# Patient Record
Sex: FEMALE | Race: WHITE | Employment: OTHER | URBAN - METROPOLITAN AREA
[De-identification: names, ages, dates, MRNs, and addresses within clinical notes are randomized per-mention and may not be internally consistent; named-entity substitution may affect disease eponyms.]

---

## 2017-07-06 ENCOUNTER — HOSPITAL ENCOUNTER (EMERGENCY)
Age: 62
Discharge: ANOTHER HOSPITAL | End: 2017-07-07
Attending: EMERGENCY MEDICINE | Admitting: EMERGENCY MEDICINE
Payer: COMMERCIAL

## 2017-07-06 ENCOUNTER — APPOINTMENT (OUTPATIENT)
Dept: CT IMAGING | Age: 62
End: 2017-07-06
Attending: EMERGENCY MEDICINE
Payer: COMMERCIAL

## 2017-07-06 ENCOUNTER — APPOINTMENT (OUTPATIENT)
Dept: GENERAL RADIOLOGY | Age: 62
End: 2017-07-06
Attending: EMERGENCY MEDICINE
Payer: COMMERCIAL

## 2017-07-06 DIAGNOSIS — R90.0 INTRACRANIAL MASS: Primary | ICD-10-CM

## 2017-07-06 LAB
ALBUMIN SERPL BCP-MCNC: 4.1 G/DL (ref 3.5–5)
ALBUMIN/GLOB SERPL: 1.2 {RATIO} (ref 1.1–2.2)
ALP SERPL-CCNC: 50 U/L (ref 45–117)
ALT SERPL-CCNC: 22 U/L (ref 12–78)
ANION GAP BLD CALC-SCNC: 8 MMOL/L (ref 5–15)
APPEARANCE UR: CLEAR
AST SERPL W P-5'-P-CCNC: 10 U/L (ref 15–37)
BACTERIA URNS QL MICRO: NEGATIVE /HPF
BASOPHILS # BLD AUTO: 0 K/UL (ref 0–0.1)
BASOPHILS # BLD: 0 % (ref 0–1)
BILIRUB SERPL-MCNC: 0.5 MG/DL (ref 0.2–1)
BILIRUB UR QL: NEGATIVE
BUN SERPL-MCNC: 13 MG/DL (ref 6–20)
BUN/CREAT SERPL: 18 (ref 12–20)
CALCIUM SERPL-MCNC: 9.6 MG/DL (ref 8.5–10.1)
CHLORIDE SERPL-SCNC: 104 MMOL/L (ref 97–108)
CO2 SERPL-SCNC: 27 MMOL/L (ref 21–32)
COLOR UR: ABNORMAL
CREAT SERPL-MCNC: 0.72 MG/DL (ref 0.55–1.02)
EOSINOPHIL # BLD: 0 K/UL (ref 0–0.4)
EOSINOPHIL NFR BLD: 0 % (ref 0–7)
EPITH CASTS URNS QL MICRO: ABNORMAL /LPF
ERYTHROCYTE [DISTWIDTH] IN BLOOD BY AUTOMATED COUNT: 14 % (ref 11.5–14.5)
GLOBULIN SER CALC-MCNC: 3.5 G/DL (ref 2–4)
GLUCOSE SERPL-MCNC: 100 MG/DL (ref 65–100)
GLUCOSE UR STRIP.AUTO-MCNC: NEGATIVE MG/DL
HCT VFR BLD AUTO: 44.4 % (ref 35–47)
HGB BLD-MCNC: 14.7 G/DL (ref 11.5–16)
HGB UR QL STRIP: NEGATIVE
HYALINE CASTS URNS QL MICRO: ABNORMAL /LPF (ref 0–5)
KETONES UR QL STRIP.AUTO: ABNORMAL MG/DL
LEUKOCYTE ESTERASE UR QL STRIP.AUTO: NEGATIVE
LYMPHOCYTES # BLD AUTO: 10 % (ref 12–49)
LYMPHOCYTES # BLD: 1.5 K/UL (ref 0.8–3.5)
MCH RBC QN AUTO: 30.4 PG (ref 26–34)
MCHC RBC AUTO-ENTMCNC: 33.1 G/DL (ref 30–36.5)
MCV RBC AUTO: 91.7 FL (ref 80–99)
MONOCYTES # BLD: 1 K/UL (ref 0–1)
MONOCYTES NFR BLD AUTO: 7 % (ref 5–13)
NEUTS SEG # BLD: 12.1 K/UL (ref 1.8–8)
NEUTS SEG NFR BLD AUTO: 83 % (ref 32–75)
NITRITE UR QL STRIP.AUTO: NEGATIVE
PH UR STRIP: 5.5 [PH] (ref 5–8)
PLATELET # BLD AUTO: 232 K/UL (ref 150–400)
POTASSIUM SERPL-SCNC: 4.3 MMOL/L (ref 3.5–5.1)
PROT SERPL-MCNC: 7.6 G/DL (ref 6.4–8.2)
PROT UR STRIP-MCNC: NEGATIVE MG/DL
RBC # BLD AUTO: 4.84 M/UL (ref 3.8–5.2)
RBC #/AREA URNS HPF: ABNORMAL /HPF (ref 0–5)
SODIUM SERPL-SCNC: 139 MMOL/L (ref 136–145)
SP GR UR REFRACTOMETRY: 1.03 (ref 1–1.03)
UA: UC IF INDICATED,UAUC: ABNORMAL
UROBILINOGEN UR QL STRIP.AUTO: 0.2 EU/DL (ref 0.2–1)
WBC # BLD AUTO: 14.6 K/UL (ref 3.6–11)
WBC URNS QL MICRO: ABNORMAL /HPF (ref 0–4)

## 2017-07-06 PROCEDURE — 80053 COMPREHEN METABOLIC PANEL: CPT | Performed by: EMERGENCY MEDICINE

## 2017-07-06 PROCEDURE — 99284 EMERGENCY DEPT VISIT MOD MDM: CPT

## 2017-07-06 PROCEDURE — 96375 TX/PRO/DX INJ NEW DRUG ADDON: CPT

## 2017-07-06 PROCEDURE — 71010 XR CHEST PORT: CPT

## 2017-07-06 PROCEDURE — 70450 CT HEAD/BRAIN W/O DYE: CPT

## 2017-07-06 PROCEDURE — 96374 THER/PROPH/DIAG INJ IV PUSH: CPT

## 2017-07-06 PROCEDURE — 74011250636 HC RX REV CODE- 250/636: Performed by: EMERGENCY MEDICINE

## 2017-07-06 PROCEDURE — 85025 COMPLETE CBC W/AUTO DIFF WBC: CPT | Performed by: EMERGENCY MEDICINE

## 2017-07-06 PROCEDURE — 81001 URINALYSIS AUTO W/SCOPE: CPT | Performed by: EMERGENCY MEDICINE

## 2017-07-06 PROCEDURE — 36415 COLL VENOUS BLD VENIPUNCTURE: CPT | Performed by: EMERGENCY MEDICINE

## 2017-07-06 RX ORDER — DEXAMETHASONE SODIUM PHOSPHATE 4 MG/ML
10 INJECTION, SOLUTION INTRA-ARTICULAR; INTRALESIONAL; INTRAMUSCULAR; INTRAVENOUS; SOFT TISSUE
Status: COMPLETED | OUTPATIENT
Start: 2017-07-06 | End: 2017-07-06

## 2017-07-06 RX ORDER — PROCHLORPERAZINE EDISYLATE 5 MG/ML
10 INJECTION INTRAMUSCULAR; INTRAVENOUS
Status: COMPLETED | OUTPATIENT
Start: 2017-07-06 | End: 2017-07-06

## 2017-07-06 RX ORDER — DIPHENHYDRAMINE HYDROCHLORIDE 50 MG/ML
25 INJECTION, SOLUTION INTRAMUSCULAR; INTRAVENOUS
Status: COMPLETED | OUTPATIENT
Start: 2017-07-06 | End: 2017-07-06

## 2017-07-06 RX ADMIN — SODIUM CHLORIDE 1000 ML: 900 INJECTION, SOLUTION INTRAVENOUS at 21:11

## 2017-07-06 RX ADMIN — DIPHENHYDRAMINE HYDROCHLORIDE 25 MG: 50 INJECTION, SOLUTION INTRAMUSCULAR; INTRAVENOUS at 21:13

## 2017-07-06 RX ADMIN — DEXAMETHASONE SODIUM PHOSPHATE 10 MG: 4 INJECTION, SOLUTION INTRAMUSCULAR; INTRAVENOUS at 22:56

## 2017-07-06 RX ADMIN — PROCHLORPERAZINE EDISYLATE 10 MG: 5 INJECTION INTRAMUSCULAR; INTRAVENOUS at 21:13

## 2017-07-07 ENCOUNTER — APPOINTMENT (OUTPATIENT)
Dept: CT IMAGING | Age: 62
DRG: 054 | End: 2017-07-07
Attending: NEUROLOGICAL SURGERY
Payer: COMMERCIAL

## 2017-07-07 ENCOUNTER — HOSPITAL ENCOUNTER (INPATIENT)
Age: 62
LOS: 1 days | Discharge: HOME | DRG: 054 | End: 2017-07-08
Attending: FAMILY MEDICINE | Admitting: FAMILY MEDICINE
Payer: COMMERCIAL

## 2017-07-07 ENCOUNTER — APPOINTMENT (OUTPATIENT)
Dept: MRI IMAGING | Age: 62
DRG: 054 | End: 2017-07-07
Attending: FAMILY MEDICINE
Payer: COMMERCIAL

## 2017-07-07 VITALS
DIASTOLIC BLOOD PRESSURE: 79 MMHG | TEMPERATURE: 97.2 F | OXYGEN SATURATION: 99 % | BODY MASS INDEX: 28.37 KG/M2 | SYSTOLIC BLOOD PRESSURE: 117 MMHG | RESPIRATION RATE: 18 BRPM | WEIGHT: 180.78 LBS | HEART RATE: 76 BPM | HEIGHT: 67 IN

## 2017-07-07 PROBLEM — D72.829 LEUKOCYTOSIS: Status: ACTIVE | Noted: 2017-07-07

## 2017-07-07 PROBLEM — R51.9 HEADACHE: Status: ACTIVE | Noted: 2017-07-07

## 2017-07-07 PROBLEM — G93.6 VASOGENIC BRAIN EDEMA (HCC): Status: ACTIVE | Noted: 2017-07-07

## 2017-07-07 PROBLEM — R41.3 MEMORY LOSS: Status: ACTIVE | Noted: 2017-07-07

## 2017-07-07 PROBLEM — R11.2 NAUSEA & VOMITING: Status: ACTIVE | Noted: 2017-07-07

## 2017-07-07 PROBLEM — G93.89 RIGHT FRONTAL LOBE MASS: Status: ACTIVE | Noted: 2017-07-07

## 2017-07-07 LAB
ANION GAP BLD CALC-SCNC: 9 MMOL/L (ref 5–15)
APTT PPP: 26 SEC (ref 22.1–32.5)
BASOPHILS # BLD AUTO: 0 K/UL (ref 0–0.1)
BASOPHILS # BLD: 0 % (ref 0–1)
BUN SERPL-MCNC: 12 MG/DL (ref 6–20)
BUN/CREAT SERPL: 14 (ref 12–20)
CALCIUM SERPL-MCNC: 9.3 MG/DL (ref 8.5–10.1)
CHLORIDE SERPL-SCNC: 110 MMOL/L (ref 97–108)
CO2 SERPL-SCNC: 23 MMOL/L (ref 21–32)
CREAT SERPL-MCNC: 0.86 MG/DL (ref 0.55–1.02)
EOSINOPHIL # BLD: 0 K/UL (ref 0–0.4)
EOSINOPHIL NFR BLD: 0 % (ref 0–7)
ERYTHROCYTE [DISTWIDTH] IN BLOOD BY AUTOMATED COUNT: 13.8 % (ref 11.5–14.5)
GLUCOSE SERPL-MCNC: 191 MG/DL (ref 65–100)
HCT VFR BLD AUTO: 40.7 % (ref 35–47)
HGB BLD-MCNC: 13.3 G/DL (ref 11.5–16)
INR PPP: 1.1 (ref 0.9–1.1)
LYMPHOCYTES # BLD AUTO: 7 % (ref 12–49)
LYMPHOCYTES # BLD: 0.8 K/UL (ref 0.8–3.5)
MCH RBC QN AUTO: 29.9 PG (ref 26–34)
MCHC RBC AUTO-ENTMCNC: 32.7 G/DL (ref 30–36.5)
MCV RBC AUTO: 91.5 FL (ref 80–99)
MONOCYTES # BLD: 0.2 K/UL (ref 0–1)
MONOCYTES NFR BLD AUTO: 2 % (ref 5–13)
NEUTS SEG # BLD: 11.7 K/UL (ref 1.8–8)
NEUTS SEG NFR BLD AUTO: 91 % (ref 32–75)
PLATELET # BLD AUTO: 216 K/UL (ref 150–400)
POTASSIUM SERPL-SCNC: 4 MMOL/L (ref 3.5–5.1)
PROTHROMBIN TIME: 11.2 SEC (ref 9–11.1)
RBC # BLD AUTO: 4.45 M/UL (ref 3.8–5.2)
SODIUM SERPL-SCNC: 142 MMOL/L (ref 136–145)
THERAPEUTIC RANGE,PTTT: NORMAL SECS (ref 58–77)
WBC # BLD AUTO: 12.8 K/UL (ref 3.6–11)

## 2017-07-07 PROCEDURE — 74011250636 HC RX REV CODE- 250/636: Performed by: FAMILY MEDICINE

## 2017-07-07 PROCEDURE — 80048 BASIC METABOLIC PNL TOTAL CA: CPT | Performed by: FAMILY MEDICINE

## 2017-07-07 PROCEDURE — 85610 PROTHROMBIN TIME: CPT | Performed by: FAMILY MEDICINE

## 2017-07-07 PROCEDURE — 36415 COLL VENOUS BLD VENIPUNCTURE: CPT | Performed by: FAMILY MEDICINE

## 2017-07-07 PROCEDURE — 65660000000 HC RM CCU STEPDOWN

## 2017-07-07 PROCEDURE — 85025 COMPLETE CBC W/AUTO DIFF WBC: CPT | Performed by: FAMILY MEDICINE

## 2017-07-07 PROCEDURE — 85730 THROMBOPLASTIN TIME PARTIAL: CPT | Performed by: FAMILY MEDICINE

## 2017-07-07 PROCEDURE — 70553 MRI BRAIN STEM W/O & W/DYE: CPT

## 2017-07-07 PROCEDURE — 74011250636 HC RX REV CODE- 250/636: Performed by: NEUROLOGICAL SURGERY

## 2017-07-07 PROCEDURE — 74011000258 HC RX REV CODE- 258: Performed by: INTERNAL MEDICINE

## 2017-07-07 PROCEDURE — 71260 CT THORAX DX C+: CPT

## 2017-07-07 PROCEDURE — 74011250637 HC RX REV CODE- 250/637: Performed by: NEUROLOGICAL SURGERY

## 2017-07-07 PROCEDURE — 74011636320 HC RX REV CODE- 636/320: Performed by: INTERNAL MEDICINE

## 2017-07-07 PROCEDURE — 77030018846 HC SOL IRR STRL H20 ICUM -A

## 2017-07-07 PROCEDURE — A9585 GADOBUTROL INJECTION: HCPCS | Performed by: FAMILY MEDICINE

## 2017-07-07 PROCEDURE — 74177 CT ABD & PELVIS W/CONTRAST: CPT

## 2017-07-07 RX ORDER — SODIUM CHLORIDE 0.9 % (FLUSH) 0.9 %
10 SYRINGE (ML) INJECTION
Status: COMPLETED | OUTPATIENT
Start: 2017-07-07 | End: 2017-07-07

## 2017-07-07 RX ORDER — DEXAMETHASONE SODIUM PHOSPHATE 4 MG/ML
4 INJECTION, SOLUTION INTRA-ARTICULAR; INTRALESIONAL; INTRAMUSCULAR; INTRAVENOUS; SOFT TISSUE EVERY 6 HOURS
Status: DISCONTINUED | OUTPATIENT
Start: 2017-07-07 | End: 2017-07-07

## 2017-07-07 RX ORDER — SODIUM CHLORIDE 0.9 % (FLUSH) 0.9 %
5-10 SYRINGE (ML) INJECTION AS NEEDED
Status: DISCONTINUED | OUTPATIENT
Start: 2017-07-07 | End: 2017-07-08 | Stop reason: HOSPADM

## 2017-07-07 RX ORDER — FAMOTIDINE 20 MG/1
20 TABLET, FILM COATED ORAL 2 TIMES DAILY
Status: DISCONTINUED | OUTPATIENT
Start: 2017-07-07 | End: 2017-07-08 | Stop reason: HOSPADM

## 2017-07-07 RX ORDER — DEXAMETHASONE SODIUM PHOSPHATE 10 MG/ML
10 INJECTION INTRAMUSCULAR; INTRAVENOUS EVERY 6 HOURS
Status: DISCONTINUED | OUTPATIENT
Start: 2017-07-07 | End: 2017-07-07

## 2017-07-07 RX ORDER — DOCUSATE SODIUM 100 MG/1
100 CAPSULE, LIQUID FILLED ORAL DAILY
Status: DISCONTINUED | OUTPATIENT
Start: 2017-07-07 | End: 2017-07-08 | Stop reason: HOSPADM

## 2017-07-07 RX ORDER — LEVETIRACETAM 500 MG/1
500 TABLET ORAL 2 TIMES DAILY
Status: DISCONTINUED | OUTPATIENT
Start: 2017-07-07 | End: 2017-07-08 | Stop reason: HOSPADM

## 2017-07-07 RX ORDER — SODIUM CHLORIDE 9 MG/ML
75 INJECTION, SOLUTION INTRAVENOUS CONTINUOUS
Status: DISCONTINUED | OUTPATIENT
Start: 2017-07-07 | End: 2017-07-07

## 2017-07-07 RX ORDER — DEXAMETHASONE 4 MG/1
4 TABLET ORAL EVERY 6 HOURS
Status: DISCONTINUED | OUTPATIENT
Start: 2017-07-07 | End: 2017-07-08 | Stop reason: HOSPADM

## 2017-07-07 RX ORDER — ONDANSETRON 2 MG/ML
4 INJECTION INTRAMUSCULAR; INTRAVENOUS
Status: DISCONTINUED | OUTPATIENT
Start: 2017-07-07 | End: 2017-07-08 | Stop reason: HOSPADM

## 2017-07-07 RX ORDER — ACETAMINOPHEN 500 MG
500 TABLET ORAL
Status: DISCONTINUED | OUTPATIENT
Start: 2017-07-07 | End: 2017-07-08 | Stop reason: HOSPADM

## 2017-07-07 RX ORDER — SODIUM CHLORIDE 0.9 % (FLUSH) 0.9 %
5-10 SYRINGE (ML) INJECTION EVERY 8 HOURS
Status: DISCONTINUED | OUTPATIENT
Start: 2017-07-07 | End: 2017-07-08 | Stop reason: HOSPADM

## 2017-07-07 RX ADMIN — DEXAMETHASONE SODIUM PHOSPHATE 10 MG: 10 INJECTION, SOLUTION INTRAMUSCULAR; INTRAVENOUS at 05:46

## 2017-07-07 RX ADMIN — IOHEXOL 50 ML: 240 INJECTION, SOLUTION INTRATHECAL; INTRAVASCULAR; INTRAVENOUS; ORAL at 12:10

## 2017-07-07 RX ADMIN — GADOBUTROL 7.5 ML: 604.72 INJECTION INTRAVENOUS at 08:00

## 2017-07-07 RX ADMIN — Medication 10 ML: at 05:46

## 2017-07-07 RX ADMIN — IOPAMIDOL 100 ML: 755 INJECTION, SOLUTION INTRAVENOUS at 12:09

## 2017-07-07 RX ADMIN — Medication 10 ML: at 23:16

## 2017-07-07 RX ADMIN — FAMOTIDINE 20 MG: 20 TABLET ORAL at 17:33

## 2017-07-07 RX ADMIN — Medication 10 ML: at 08:00

## 2017-07-07 RX ADMIN — Medication 10 ML: at 05:47

## 2017-07-07 RX ADMIN — LEVETIRACETAM 500 MG: 500 TABLET ORAL at 17:34

## 2017-07-07 RX ADMIN — SODIUM CHLORIDE 75 ML/HR: 900 INJECTION, SOLUTION INTRAVENOUS at 02:28

## 2017-07-07 RX ADMIN — DEXAMETHASONE SODIUM PHOSPHATE 4 MG: 4 INJECTION, SOLUTION INTRAMUSCULAR; INTRAVENOUS at 11:32

## 2017-07-07 RX ADMIN — DEXAMETHASONE 4 MG: 4 TABLET ORAL at 17:34

## 2017-07-07 RX ADMIN — DOCUSATE SODIUM 100 MG: 100 CAPSULE, LIQUID FILLED ORAL at 09:42

## 2017-07-07 RX ADMIN — Medication 10 ML: at 12:09

## 2017-07-07 RX ADMIN — FAMOTIDINE 20 MG: 20 TABLET ORAL at 09:42

## 2017-07-07 RX ADMIN — DEXAMETHASONE 4 MG: 4 TABLET ORAL at 23:16

## 2017-07-07 RX ADMIN — SODIUM CHLORIDE 100 ML: 900 INJECTION, SOLUTION INTRAVENOUS at 12:10

## 2017-07-07 NOTE — PROGRESS NOTES
Music Therapy Assessment    Ventura Peters [id number]  [id number]    1955  64 y.o.  female    Patient Telephone Number: [phone] (home)   [employer]ian Affiliation: No preference   Language: English   Extended Emergency Contact Information  Primary Emergency Contact: Waqar Pickard Phone: [phone]  Relation: Child  Secondary Emergency Contact: [address] Phone: [phone]  Relation: Spouse   Patient Active Problem List    Diagnosis Date Noted    Memory loss 07/07/2017    Leukocytosis 07/07/2017    Nausea & vomiting 07/07/2017    Vasogenic brain edema ([facility] [address] 07/07/2017    Right frontal lobe mass 07/07/2017    Headache 07/07/2017        Date: 7/7/2017       Mental Status:   [x] Alert [  ] Cyntha Mould [  ]  Confused  [  ] Minimally responsive    Communication Status: [  ] Impaired Speech [  ] Nonverbal -Difficulty with word finding at times    Physical Status:   [  ] Oxygen in use  [  ] Hard of Hearing [  ] Vision Impaired  [  ] Ambulatory  [  ] Ambulatory with assistance [  ] Non-ambulatory -N/A    Music Preferences, Background: Rock and roll, especially Omid Celestino, [place], some Jazz. Clinical Problem addressed: Positive social interaction, support healthy coping. Goal(s) met in session:  Physical/Pain management (Scale of 1-10):    Pre-session rating: Pt denied pain. Post-session rating: Pt denied pain.    [  ] Increased relaxation   [  ] Regulated breathing patterns  [  ] Decreased muscle tension   [  ] Minimized physical distress     Emotional/Psychological:  [  ] Increased self-expression   [  ] Decreased aggressive behavior   [  ] Decreased sadness   [  ] Discussed healthy coping skills     [  ] Improved mood    [  ] Decreased withdrawn behavior     Social:  [  ] Decreased feelings of isolation/loneliness [x] Positive social interaction   [  ] Provided support and/or comfort for family/friends    Spiritual:  [  ] Spiritual support    [  ] Expressed peace  [  ] Expressed justa    [  ] Discussed beliefs    Techniques Utilized (Check all that apply):   [  ] Procedural support MT [  ] Music for relaxation [  ] Patient preferred music  [  ] Jenny analysis  [  ] Song choice  [  ] Music for validation  [  ] Entrainment  [  ] Progressive muscle relax. [  ] Guided visualization  [  ] Sandra Cage  [  ] Patient instrument playing [  ] Angella Scriver writing  [  ] Simnoe Lanes along   [  ] Nagi   [  ] Sensory stimulation  [x] Active Listening  [  ] Music for spiritual support [  ] Making of CDs as gifts    Session Observations:  Referral from Antonette Young, Patient Advocate at the [facility]. The patient (pt) was observed to be lying comfortably in bed and she denied pain. She said she's waiting to receive a diagnosis. This music therapist (MT) provided active listening and words of validation as the pt shared that she's from [place]. She and her spouse were traveling to a [employer] in [place] in an [address] when, on their way, she needed to be hospitalized. She expressed contradicting thoughts; at one point, she said she thinks she'd want to return to [place] for whatever treatment she may need. Then at the end of the session she said she wants to leave [facility] to continue her trip to [facility]. She engaged in sharing her music preferences, but declined hearing music. She denied any needs at the conclusion of the session. Will follow as able.     PHUONG BardalesBC (Music Therapist-Board Certified)  Spiritual Care Department  Referral-based service

## 2017-07-07 NOTE — CONSULTS
PULMONARY/CRITICAL CARE/SLEEP MEDICINE    Initial Physician Consultation Note    Name: Martin Ortiz   : 1955   MRN: [id number]   Date: 2017      Subjective:   Consult Note: 2017     This patient has been seen and evaluated earlier today as a new patient admitted to ICU. Medical records and data reviewed. Patient is a 64 y.o. female who was transferred to [facility] for work up of intra-cranial tumor. 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 Neurosurgery follow up is pending regrading discussion of diagnosis and management options. She is on steroids and reports headache and nausea are better      Assessment:     Intracranial tumor with vasogenic edema  Leukocytosis- on steroids    Recommendations:   Discussion of MRI findings and management plans per neurosurgery  On steroids  On pepcid  SCDs  D/W RN  We will be available to see her if asked, over the weekend. Active Problem List:     Problem List  Date Reviewed: 2017          Codes Class    Memory loss ICD-10-CM: R41.3  ICD-9-CM: 780.93         Leukocytosis ICD-10-CM: D72.829  ICD-9-CM: 288.60         Nausea & vomiting ICD-10-CM: R11.2  ICD-9-CM: 787.01         Vasogenic brain edema (HCC) ICD-10-CM: G93.6  ICD-9-CM: 348.5         * (Principal)Right frontal lobe mass ICD-10-CM: G93.9  ICD-9-CM: 348.9         Headache ICD-10-CM: R51  ICD-9-CM: 784.0               Past Medical History:      has no past medical history on file. Past Surgical History:      has no past surgical history on file. Home Medications:     Prior to Admission medications    Not on File       Allergies/Social/Family History:     No Known Allergies   Social History   Substance Use Topics    Smoking status: Not on file    Smokeless tobacco: Not on file    Alcohol use Not on file      History reviewed. No pertinent family history.      Review of Systems:     A comprehensive review of systems was negative except for that written in the HPI. Objective:   Vital Signs:  Visit Vitals    /60    Pulse 83    Temp 98.1 °F (36.7 °C)    Resp 16    Wt 82.2 kg (181 lb 3.5 oz)    SpO2 96%    BMI 28.38 kg/m2      O2 Device: Room air Temp (24hrs), Av °F (36.7 °C), Min:97.2 °F (36.2 °C), Max:98.4 °F (36.9 °C)           Intake/Output:     Intake/Output Summary (Last 24 hours) at 17 1357  Last data filed at 17 1300   Gross per 24 hour   Intake              690 ml   Output              350 ml   Net              340 ml       Physical Exam:   General:  Alert, cooperative, no distress, appears stated age. Head:  Normocephalic, without obvious abnormality, atraumatic. Eyes:  Conjunctivae/corneas clear. PERRL, EOMs intact. Neck: Supple, symmetrical, trachea midline, no adenopathy, thyroid: no enlargment/tenderness/nodules, no carotid bruit and no JVD. Lungs:   Clear to auscultation bilaterally. Chest wall:  No tenderness or deformity. Heart:  Regular rate and rhythm, S1, S2 normal, no murmur, click, rub or gallop. Abdomen:   Soft, non-tender. Bowel sounds normal. No masses,  No organomegaly. Extremities: Extremities normal, atraumatic, no cyanosis or edema. Pulses: 2+ and symmetric all extremities. Skin: Skin color, texture, turgor normal. No rashes or lesions   Neurologic: Grossly nonfocal         LABS AND  DATA: Personally reviewed  Recent Labs      17   WBC  12.8*  14.6*   HGB  13.3  14.7   HCT  40.7  44.4   PLT  216  232     Recent Labs      17   NA  142  139   K  4.0  4.3   CL  110*  104   CO2  23  27   BUN  12  13   CREA  0.86  0.72   GLU  191*  100   CA  9.3  9.6     Recent Labs      17   SGOT  10*   AP  50   TP  7.6   ALB  4.1   GLOB  3.5     Recent Labs      17   0543   INR  1.1   PTP  11.2*   APTT  26.0      No results for input(s): PHI, PCO2I, PO2I, FIO2I in the last 72 hours.   No results for input(s): CPK, CKMB, TROIQ, BNPP in the last 72 hours. MEDS: Reviewed    Chest X-Ray: personally reviewed and report checked    EKG/ Tele      Thank you for allowing me to participate in this patient's care.     MD Hernando Chu MD, [facility]  [facility]

## 2017-07-07 NOTE — PROGRESS NOTES
Films reviewed and pt and family counseled at bedside. Solitary right frontal polar heterogenously enhancing tumor with significant edema. Ct body negative. 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 Pt is from [facility] and traveling via  to [place]. The next step is craniotomy for ressection. I am happy to proceed early next week, but also reasonable for them to return home for surgery in next 7-10 days. They are going to consider their options tonight and decide tomorrow. Switch to oral steroids and add keppra.   xfer to stepdown
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
## 2017-07-07 NOTE — PROGRESS NOTES
Hospitalist Progress Note  Maday Breen MD  Office: [phone]        Date of Service:  2017  NAME:  Rafal Rodriguez  :  1955  MRN:  [id number]      Admission Summary: This 70-year-old white female with no significant past medical history, who was admitted with chief complaint of intermittent headaches, memory loss, nausea and vomiting. Head CT showed a possible right frontal lobe mass. Patient was admitted for further evaluation by Neurosurgery. Interval history / Subjective:   No overnight events noted. MRI was completed this morning. I did not discuss the results with them, as I am sure they will have a lot of questions about treatment/ possible surgery in view of this life-changing diagnosis. Assessment & Plan:     Right frontal lobe mass with vasogenic brain edema:  - Appreciate Neurosurgery consult by Dr. Cassandra Peng;   - IV Decadron 10 mg IV q.6 hours, neurovascular checks, fall precautions, seizure precautions. - pain management for headaches;   - related memory loss. - MRI with and without contrast : Large right frontal lobe intra-axial mass with marked surrounding edema and mass effect. Imaging features favor a high-grade glioma. .    Code status: full  DVT prophylaxis: SCD's    Care Plan discussed with: Patient/Family and Nurse  Disposition: Home w/Family and TBD        Hospital Problems  Date Reviewed: 2017          Codes Class Noted POA    Memory loss ICD-10-CM: R41.3  ICD-9-CM: 780.93  2017 Unknown        Leukocytosis ICD-10-CM: D72.829  ICD-9-CM: 288.60  2017 Unknown        Nausea & vomiting ICD-10-CM: R11.2  ICD-9-CM: 787.01  2017 Unknown        Vasogenic brain edema ([facility] [address] ICD-10-CM: G93.6  ICD-9-CM: 348.5  2017 Unknown        * (Principal)Right frontal lobe mass ICD-10-CM: G93.9  ICD-9-CM: 348.9  2017 Unknown        Headache ICD-10-CM: R51  ICD-9-CM: 784.0  2017 Unknown Review of Systems:   Pertinent items are noted in HPI. Vital Signs:    Last 24hrs VS reviewed since prior progress note. Most recent are:  Visit Vitals    /62    Pulse 95    Temp 97.8 °F (36.6 °C)    Resp 16    Wt 82.2 kg (181 lb 3.5 oz)    SpO2 93%    BMI 28.38 kg/m2         Intake/Output Summary (Last 24 hours) at 07/07/17 1108  Last data filed at 07/07/17 0700   Gross per 24 hour   Intake              340 ml   Output                0 ml   Net              340 ml        Physical Examination:             Constitutional:  No acute distress, cooperative, pleasant    ENT:  Oral mucous moist, oropharynx benign. Neck supple,    Resp:  CTA bilaterally. No wheezing/rhonchi/rales. CV:  Regular rhythm, normal rate, no murmurs, gallops, rubs    GI:  Soft, non distended, non tender. normoactive bowel sounds;    Musculoskeletal:  No edema, warm;    Neurologic:  Moves all extremities. AAOx3;     Skin:  Good turgor, no rashes or ulcers       Data Review:    Review and/or order of clinical lab test  Review and/or order of tests in the radiology section of CPT  Review and/or order of tests in the medicine section of CPT      Labs:     Recent Labs      07/07/17   0421  07/06/17 1939   WBC  12.8*  14.6*   HGB  13.3  14.7   HCT  40.7  44.4   PLT  216  232     Recent Labs      07/07/17   0421  07/06/17 1939   NA  142  139   K  4.0  4.3   CL  110*  104   CO2  23  27   BUN  12  13   CREA  0.86  0.72   GLU  191*  100   CA  9.3  9.6     Recent Labs      07/06/17 1939   SGOT  10*   ALT  22   AP  50   TBILI  0.5   TP  7.6   ALB  4.1   GLOB  3.5     Recent Labs      07/07/17   0543   INR  1.1   PTP  11.2*   APTT  26.0      No results for input(s): FE, TIBC, PSAT, FERR in the last 72 hours. No results found for: FOL, RBCF   No results for input(s): PH, PCO2, PO2 in the last 72 hours. No results for input(s): CPK, CKNDX, TROIQ in the last 72 hours.     No lab exists for component: CPKMB  No results found for: CHOL, CHOLX, CHLST, CHOLV, HDL, LDL, LDLC, DLDLP, TGLX, TRIGL, TRIGP, CHHD, CHHDX  No results found for: [facility]  Lab Results   Component Value Date/Time    Color YELLOW/STRAW 07/06/2017 09:04 PM    Appearance CLEAR 07/06/2017 09:04 PM    Specific gravity 1.027 07/06/2017 09:04 PM    pH (UA) 5.5 07/06/2017 09:04 PM    Protein NEGATIVE  07/06/2017 09:04 PM    Glucose NEGATIVE  07/06/2017 09:04 PM    Ketone TRACE 07/06/2017 09:04 PM    Bilirubin NEGATIVE  07/06/2017 09:04 PM    Urobilinogen 0.2 07/06/2017 09:04 PM    Nitrites NEGATIVE  07/06/2017 09:04 PM    Leukocyte Esterase NEGATIVE  07/06/2017 09:04 PM    Epithelial cells FEW 07/06/2017 09:04 PM    Bacteria NEGATIVE  07/06/2017 09:04 PM    WBC 0-4 07/06/2017 09:04 PM    RBC 0-5 07/06/2017 09:04 PM         Medications Reviewed:     Current Facility-Administered Medications   Medication Dose Route Frequency    sodium chloride (NS) flush 5-10 mL  5-10 mL IntraVENous Q8H    sodium chloride (NS) flush 5-10 mL  5-10 mL IntraVENous PRN    0.9% sodium chloride infusion  75 mL/hr IntraVENous CONTINUOUS    dexamethasone (DECADRON) 4 mg/mL injection 4 mg  4 mg IntraVENous Q6H    famotidine (PEPCID) tablet 20 mg  20 mg Oral BID    docusate sodium (COLACE) capsule 100 mg  100 mg Oral DAILY    ondansetron (ZOFRAN) injection 4 mg  4 mg IntraVENous Q4H PRN    acetaminophen (TYLENOL) tablet 500 mg  500 mg Oral Q4H PRN     ______________________________________________________________________  EXPECTED LENGTH OF STAY: 4d 16h  ACTUAL LENGTH OF STAY:          0                 Nettie Newman MD

## 2017-07-07 NOTE — IP AVS SNAPSHOT
Summary of Care Report The Summary of Care report has been created to help improve care coordination. Users with access to [place] or [address] (Web-based application) may access additional patient information including the Discharge Summary. If you are not currently a [address] user and need more information, please call the number listed below in the [place] [address] section and ask to be connected with Medical Records. Facility Information Name Address Phone [address] [phone] Patient Information Patient Name Sex  Anthony Mendez ([id number]) Female 1955 Discharge Information Admitting Provider Service Area Unit Julissa Husain MD / [address] [place] [address] Intensive Care / [phone] Discharge Provider Discharge Date/Time Discharge Disposition Destination (none) 2017 (Pending) AHR (none) Patient Language Language ENGLISH [13] Hospital Problems as of 2017  Reviewed: 2017  3:09 AM by Julissa Husain MD  
  
  
  
 Class Noted - Resolved Last Modified POA Active Problems Memory loss  2017 - Present 2017 by Julissa Husain MD Unknown Entered by Julissa Husain MD  
  Leukocytosis  2017 - Present 2017 by Julissa Husain MD Unknown Entered by Julissa Husain MD  
  Nausea & vomiting  2017 - Present 2017 by Julissa Husain MD Unknown Entered by Julissa Husain MD  
  Vasogenic brain edema [facility])  2017 - Present 2017 by Julissa Husain MD Unknown Entered by Julissa Husain MD  
  * (Principal)Right frontal lobe mass  2017 - Present 2017 by Julissa Husain MD Unknown Entered by Julissa Husain MD  
  Headache  2017 - Present 2017 by Julissa Husain MD Unknown   Entered by Julissa Husain MD  
 Non-Hospital Problems as of 7/8/2017  Reviewed: 7/7/2017  3:09 AM by Arik Rodriguez MD  
 None You are allergic to the following No active allergies Current Discharge Medication List  
  
START taking these medications Dose & Instructions Dispensing Information Comments  
 dexamethasone 4 mg tablet Commonly known as:  DECADRON  
 1 tab every 6 hours Quantity:  28 Tab Refills:  0  
   
 famotidine 20 mg tablet Commonly known as:  PEPCID Dose:  20 mg Take 1 Tab by mouth two (2) times a day. Quantity:  28 Tab Refills:  0  
   
 levETIRAcetam 500 mg tablet Commonly known as:  KEPPRA Dose:  500 mg Take 1 Tab by mouth two (2) times a day. Quantity:  28 Tab Refills:  0 Follow-up Information Follow up With Details Comments Contact Info Neurosurgery In 2 days Discharge Instructions Discharge Instructions PATIENT ID: Dana Cohen MRN: [id number] YOB: 1955 DATE OF ADMISSION: 7/7/2017  1:24 AM   
DATE OF DISCHARGE: 7/8/2017 PRIMARY CARE PROVIDER: Tricia Gan MD  
 
ATTENDING PHYSICIAN: Chin Chao MD 
DISCHARGING PROVIDER: Chin Chao MD   
To contact this individual call [phone] and ask the  to page. If unavailable ask to be transferred the Adult Hospitalist Department. DISCHARGE DIAGNOSES Frontal mass CONSULTATIONS: IP CONSULT TO NEUROSURGERY 
 
PROCEDURES/SURGERIES: * No surgery found * PENDING TEST RESULTS:  
At the time of discharge the following test results are still pending: none FOLLOW UP APPOINTMENTS:  
Follow-up Information Follow up With Details Comments Contact Info Neurosurgery In 2 days ADDITIONAL CARE RECOMMENDATIONS:  
Follow up with Neurosurgery on Monday in 2 days DIET: Regular Diet ACTIVITY: Activity as tolerated DISCHARGE MEDICATIONS: 
 See Medication Reconciliation Form · It is important that you take the medication exactly as they are prescribed. · Keep your medication in the bottles provided by the pharmacist and keep a list of the medication names, dosages, and times to be taken in your wallet. · Do not take other medications without consulting your doctor. NOTIFY YOUR PHYSICIAN FOR ANY OF THE FOLLOWING:  
Fever over 101 degrees for 24 hours. Chest pain, shortness of breath, fever, chills, nausea, vomiting, diarrhea, change in mentation, falling, weakness, bleeding. Severe pain or pain not relieved by medications. Or, any other signs or symptoms that you may have questions about. DISPOSITION: 
x  Home With: 
 OT  PT  [facility]  RN  
  
 SNF/Inpatient Rehab/LTAC Independent/assisted living Hospice Other: CDMP Checked:  
Yes x PROBLEM LIST Updated: 
Yes x Signed:  
Lisandra Chavez MD 
7/8/2017 11:39 AM 
 
Chart Review Routing History No Routing History on File

## 2017-07-07 NOTE — PROGRESS NOTES
Responded to call from 8700 \[facility] [place] [facility]\"" to provide support to patient and  at time of new diagnosis. Introduced self and explained role of chaplains in the hospital. Patient repeatedly stated that she is fine and has no needs at this time. Patient's  was sitting in the corner of the room and tearful but declined to share further at this time. Provided supportive presence as both shared that they are still processing the new information. Patient shared that they are being transferred to [facility] and would be open to possible follow-up visits from spiritual care at another time. Advised patient and family of  availability and assured of continued support as needed and desired. Chaplains will follow as able. MICHA MaciasDiv.    Paging Service [phone])

## 2017-07-07 NOTE — PROGRESS NOTES
0800: Report received from Harshil Dover RN using SBAR format. Care assumed. Pt currently off floor for MRI imaging. 0863: Pt back to floor via stretcher. Ambulated back to bed unassisted. Placed back on wall monitor. Denies any c/o pain, numbness or tingling at this time. See doc flow sheets for assessment. 0930: Report given to Edu Mcclure RN using SBAR format.

## 2017-07-07 NOTE — PROGRESS NOTES
Shift Summary:  Patient neurologically intact, denies pain. VSS, NSR on Room air. CT scan completed at 1200. Patient ambulates with no assistance. Patient and family discussed results of MRI and CTs with Dr. Yanna Looney this evening. Family attempting to contact MD at home in [place], but will decide if they are going to preceed with surgery at [facility] by tomorrow 7/8. Radiology notified that patient needs imaging discs for possible discharge tomorrow.

## 2017-07-07 NOTE — CONSULTS
Spoke to ER MD and films reviewed. Right frontal edema concerning for mass less likely cva. Needs mri with and without rei.   Transfer to [facility]

## 2017-07-07 NOTE — PROGRESS NOTES
Spiritual Care Assessment/Progress Notes    Evan Arteaga [id number]  [id number]    1955  64 y.o.  female    Patient Telephone Number: [phone] (home)   Adventist Affiliation: No preference   Language: English   Extended Emergency Contact Information  Primary Emergency Contact: Waqar Pickard Phone: [phone]  Relation: Child  Secondary Emergency Contact: [address] Phone: [phone]  Relation: Spouse   Patient Active Problem List    Diagnosis Date Noted    Memory loss 07/07/2017    Leukocytosis 07/07/2017    Nausea & vomiting 07/07/2017    Vasogenic brain edema ([facility] [address] 07/07/2017    Right frontal lobe mass 07/07/2017    Headache 07/07/2017        Date: 7/7/2017       Level of Adventist/Spiritual Activity:  []         Involved in justa tradition/spiritual practice    []         Not involved in justa tradition/spiritual practice  []         Spiritually oriented    [x]         Claims no spiritual orientation    []         seeking spiritual identity  []         Feels alienated from Mormonism practice/tradition  []         Feels angry about Mormonism practice/tradition  [x]         Spirituality/Mormonism tradition IS NOT a resource for coping at this time.   []         Not able to assess due to medical condition    Services Provided Today:  []         crisis intervention    []         reading Scriptures  [x]         spiritual assessment    []         prayer  [x]         empathic listening/emotional support  []         rites and rituals (cite in comments)  []         life review     []         Mormonism support  []         theological development   []         advocacy  []         ethical dialog     []         blessing  []         bereavement support    [x]         support to family  []         anticipatory grief support   []         help with AMD  []         spiritual guidance    []         meditation      Spiritual Care Needs  [] Emotional Support  []         Spiritual/Gnosticist Care  []         Loss/Adjustment  []         Advocacy/Referral                /Ethics  [x]         No needs expressed at               this time  []         Other: (note in               comments)  Spiritual Care Plan  []         Follow up visits with               pt/family  []         Provide materials  []         Schedule sacraments  []         Contact Community               Clergy  [x]         Follow up as needed  []         Other: (note in               comments)     Comments: Initial spiritual assessment in ICU. Miss Sommer's  and daughter were present, all were in a good mood. They shared that family and love is their source of strength. They do not have any defined spiritual beliefs. Provided supportive presence and advised of  Availability.   Visited by: Yolette Winston [address] ([id number])

## 2017-07-07 NOTE — ED NOTES
Pt has RM at [facility] ICU RM 11, will received call for ETA and nurse to nurse from transfer center.

## 2017-07-07 NOTE — PROGRESS NOTES
Pt seen and examined. Full consult dictated. Right frontal edema concerning for mass. No h/o cancer. No recent illnesses. Mri pending this am.  Cont iv decadron.  Further recs pending

## 2017-07-07 NOTE — ED PROVIDER NOTES
HPI Comments: Talia Meadows is a 64 y.o. female with no known PMHx who presents ambulatory to [facility] ED with cc of gradual onset daily intermittent intermittent HA x 3 weeks with associated nausea and vomiting (4 total episodes). Pt reports multiple HA's throughout the day which resolve after a dose of Tylenol but worsened after prolonged use of the computer. Pt does have a HA in the ED and describes it as a 7/10. Per daughter pt has also had a mental status change x 3 weeks. She states the pt has been more withdrawn with decreased short term memory. Duaghter also reports the pt is taking naps more often and has not been cooking which are both very unusual. Daughter reports the pt has also had intermittent left sided facial droop when smiling. Pt has not had a colonoscopy done but does have regular mammograms, her last mammogram was 5 years ago and normal. Pt specifically denies any fevers, chills, head injury, fall, syncope, abdominal pain, nausea, vomiting, diarrhea, dysuria, hematuria, urinary frequency, recent known tick bites, weakness, numbness, tingling, CP, SOB, visual changes, slurred speech. PCP: Tricia Gan MD    Social Hx: - tobacco (-), -EtOH (-), - illicit drugs (-). There are no other complaints, changes or physical findings at this time. The history is provided by the patient and a relative. No  was used. No past medical history on file. No past surgical history on file. No family history on file. Social History     Social History    Marital status:      Spouse name: N/A    Number of children: N/A    Years of education: N/A     Occupational History    Not on file.      Social History Main Topics    Smoking status: Not on file    Smokeless tobacco: Not on file    Alcohol use Not on file    Drug use: Not on file    Sexual activity: Not on file     Other Topics Concern    Not on file     Social History Narrative         ALLERGIES: Review of patient's allergies indicates no known allergies. Review of Systems   Constitutional: Negative for chills, fatigue and fever. HENT: Negative for congestion, rhinorrhea and sore throat. Eyes: Negative for pain, discharge and visual disturbance. Respiratory: Negative for cough, chest tightness, shortness of breath and wheezing. Cardiovascular: Negative for chest pain, palpitations and leg swelling. Gastrointestinal: Positive for nausea and vomiting. Negative for abdominal pain, constipation and diarrhea. Genitourinary: Negative for dysuria, frequency and hematuria. Musculoskeletal: Negative for arthralgias, back pain and myalgias. Skin: Negative for rash. Neurological: Positive for facial asymmetry and headaches. Negative for dizziness, syncope, speech difficulty, weakness, light-headedness and numbness. Psychiatric/Behavioral:        Positive for AMS       Patient Vitals for the past 12 hrs:   Temp Pulse Resp BP SpO2   07/06/17 2243 98.3 °F (36.8 °C) 76 18 119/72 99 %   07/06/17 2134 - 77 18 127/77 99 %   07/06/17 1833 98.3 °F (36.8 °C) 80 18 (!) 144/101 98 %            Physical Exam   Constitutional: She is oriented to person, place, and time. She appears well-developed and well-nourished. No distress. HENT:   Head: Normocephalic and atraumatic. Eyes: EOM are normal. Pupils are equal, round, and reactive to light. Right eye exhibits no discharge. Left eye exhibits no discharge. No scleral icterus. Neck: Normal range of motion. Neck supple. No tracheal deviation present. Cardiovascular: Normal rate, regular rhythm, normal heart sounds and intact distal pulses. Exam reveals no gallop and no friction rub. No murmur heard. Pulmonary/Chest: Effort normal and breath sounds normal. No respiratory distress. She has no wheezes. She has no rales. Abdominal: Soft. She exhibits no distension. There is no tenderness. Musculoskeletal: Normal range of motion. She exhibits no edema. Lymphadenopathy:     She has no cervical adenopathy. Neurological: She is alert and oriented to person, place, and time. No focal neuro deficits  Negative pronator drift. Negative leg drift. Finger nose finger and heel to shin intact bilaterally. Strength is 5/5 all extremities. Face is symmetric. Speech is normal.    Skin: Skin is warm and dry. No rash noted. Psychiatric: She has a normal mood and affect. Her speech is normal.   Nursing note and vitals reviewed. MDM  Number of Diagnoses or Management Options  Intracranial mass:   Diagnosis management comments:     Patient presents to ED with daily headaches x 3 weeks. HCT consistent with right frontal mass. Patient remains alert, oriented neurologically intact. Discussed with NSG - will transfer to [facility] for further evaluation. Patient treated with decadron. Amount and/or Complexity of Data Reviewed  Clinical lab tests: ordered and reviewed  Tests in the radiology section of CPT®: ordered and reviewed  Obtain history from someone other than the patient: yes (daughter)  Review and summarize past medical records: yes  Discuss the patient with other providers: yes (neurosurgery)    Patient Progress  Patient progress: stable    ED Course       Procedures    CONSULT NOTE:   10:31 PM  Patricia Helm MD spoke with Dr. Guy Thorne,   Specialty: neurosurgery  Discussed pt's hx, disposition, and available diagnostic and imaging results. Reviewed care plans. Consultant agrees with plans as outlined. He recommends transferring the pt to hospitalist at [facility]. .   Written by GERMAN Mouraibe, as dictated by Patricia Helm MD.    PROGRESS NOTE:  10:38 PM  Updated pt on plan of care, pt agrees to be transferred. Will call Transfer center.   Written by GERMAN Mouraibe, as dictated by Patricia Helm MD.    PROGRESS NOTE:  10:47 PM  Transfer center called back, speaking with hospitalist at [facility] now.  Written by Dane Grant, ED Scribe, as dictated by Roland Mercado MD.    CONSULT NOTE:   10:51 PM  Roland Mercado MD spoke with Dr. Kimberly Holbrook,   Specialty: Hospitalist  Discussed pt's hx, disposition, and available diagnostic and imaging results. Reviewed care plans. Consultant will evaluate pt for admission. Written by Dane Grant, ED Scribe, as dictated by Roland Mercado MD.      LABORATORY TESTS:  Recent Results (from the past 12 hour(s))   CBC WITH AUTOMATED DIFF    Collection Time: 07/06/17  7:39 PM   Result Value Ref Range    WBC 14.6 (H) 3.6 - 11.0 K/uL    RBC 4.84 3.80 - 5.20 M/uL    HGB 14.7 11.5 - 16.0 g/dL    HCT 44.4 35.0 - 47.0 %    MCV 91.7 80.0 - 99.0 FL    MCH 30.4 26.0 - 34.0 PG    MCHC 33.1 30.0 - 36.5 g/dL    RDW 14.0 11.5 - 14.5 %    PLATELET 004 623 - 121 K/uL    NEUTROPHILS 83 (H) 32 - 75 %    LYMPHOCYTES 10 (L) 12 - 49 %    MONOCYTES 7 5 - 13 %    EOSINOPHILS 0 0 - 7 %    BASOPHILS 0 0 - 1 %    ABS. NEUTROPHILS 12.1 (H) 1.8 - 8.0 K/UL    ABS. LYMPHOCYTES 1.5 0.8 - 3.5 K/UL    ABS. MONOCYTES 1.0 0.0 - 1.0 K/UL    ABS. EOSINOPHILS 0.0 0.0 - 0.4 K/UL    ABS. BASOPHILS 0.0 0.0 - 0.1 K/UL   METABOLIC PANEL, COMPREHENSIVE    Collection Time: 07/06/17  7:39 PM   Result Value Ref Range    Sodium 139 136 - 145 mmol/L    Potassium 4.3 3.5 - 5.1 mmol/L    Chloride 104 97 - 108 mmol/L    CO2 27 21 - 32 mmol/L    Anion gap 8 5 - 15 mmol/L    Glucose 100 65 - 100 mg/dL    BUN 13 6 - 20 MG/DL    Creatinine 0.72 0.55 - 1.02 MG/DL    BUN/Creatinine ratio 18 12 - 20      GFR est AA >60 >60 ml/min/1.73m2    GFR est non-AA >60 >60 ml/min/1.73m2    Calcium 9.6 8.5 - 10.1 MG/DL    Bilirubin, total 0.5 0.2 - 1.0 MG/DL    ALT (SGPT) 22 12 - 78 U/L    AST (SGOT) 10 (L) 15 - 37 U/L    Alk.  phosphatase 50 45 - 117 U/L    Protein, total 7.6 6.4 - 8.2 g/dL    Albumin 4.1 3.5 - 5.0 g/dL    Globulin 3.5 2.0 - 4.0 g/dL    A-G Ratio 1.2 1.1 - 2.2     URINALYSIS W/ REFLEX CULTURE    Collection Time: 07/06/17  9:04 PM   Result Value Ref Range    Color YELLOW/STRAW      Appearance CLEAR CLEAR      Specific gravity 1.027 1.003 - 1.030      pH (UA) 5.5 5.0 - 8.0      Protein NEGATIVE  NEG mg/dL    Glucose NEGATIVE  NEG mg/dL    Ketone TRACE (A) NEG mg/dL    Bilirubin NEGATIVE  NEG      Blood NEGATIVE  NEG      Urobilinogen 0.2 0.2 - 1.0 EU/dL    Nitrites NEGATIVE  NEG      Leukocyte Esterase NEGATIVE  NEG      WBC 0-4 0 - 4 /hpf    RBC 0-5 0 - 5 /hpf    Epithelial cells FEW FEW /lpf    Bacteria NEGATIVE  NEG /hpf    UA:UC IF INDICATED CULTURE NOT INDICATED BY UA RESULT CNI      Hyaline cast 0-2 0 - 5 /lpf       IMAGING RESULTS:  CT HEAD WO CONT   Final Result   CT Results  (Last 48 hours)               07/06/17 2209  CT HEAD WO CONT Final result    Impression:  IMPRESSION:        Findings suspicious for a right frontal lobe mass with surrounding vasogenic   edema resulting in leftward midline shift of 8 mm. There is no acute   intracranial hemorrhage. Further evaluation with MRI with contrast is suggested. Narrative:  EXAM:  CT HEAD WO CONT       INDICATION: Headache for 3 days with short-term memory loss for 3 weeks. One   episode of vomiting. COMPARISON: None       TECHNIQUE: Noncontrast head CT. Coronal and sagittal reformats. CT dose   reduction was achieved through use of a standardized protocol tailored for this   examination and automatic exposure control for dose modulation. FINDINGS:    There is a possible mass centered in the right frontal lobe measuring 2.6 x 3.3   cm (series 2, image 9) with a large area of surrounding subcortical edema   resulting in diffuse mass effect and effacement of the frontal horn of the right   lateral ventricle. There is leftward midline shift measuring 8 mm. There is no acute hydrocephalus. There is no acute intracranial hemorrhage. The   basal cisterns are patent. There is no pathologic extra-axial collection.        The osseous structures are intact. The visualized paranasal sinuses and mastoid   air cells are clear. XR CHEST PORT   Final Result   CXR Results  (Last 48 hours)               07/06/17 2131  XR CHEST PORT Final result    Impression:  IMPRESSION: No evidence of acute cardiopulmonary process. Narrative:  INDICATION:  cough        FINDINGS: Single AP portable view of the chest obtained at 2120 demonstrates a   normal cardiomediastinal silhouette. The lungs are clear bilaterally. No osseous   abnormalities are seen. MEDICATIONS GIVEN:  Medications   sodium chloride 0.9 % bolus infusion 1,000 mL (0 mL IntraVENous IV Completed 7/6/17 2133)   diphenhydrAMINE (BENADRYL) injection 25 mg (25 mg IntraVENous Given 7/6/17 2113)   prochlorperazine (COMPAZINE) injection 10 mg (10 mg IntraVENous Given 7/6/17 2113)   dexamethasone (DECADRON) 4 mg/mL injection 10 mg (10 mg IntraVENous Given 7/6/17 2256)       IMPRESSION:  1. Intracranial mass        PLAN: Transfer     TRANSFER NOTE:  10:51 PM  Pt is being transferred to Internal medicine department at [facility], transfer accepted by Dr. Nia Yu.  The reasons for pt's transfer have been discussed with the pt and available family. They convey agreement and understanding for the need to be transferred as explained to them by Heather Schmidt MD.    This note is prepared by Basilia Harvey acting as Scribe for MD Heather Jennings MD: The scribe's documentation has been prepared under my direction and personally reviewed by me in its entirety. I confirm that the note above accurately reflects all work, treatment, procedures, and medical decision making performed by me.

## 2017-07-07 NOTE — IP AVS SNAPSHOT
[address] 
[phone] Patient: Adalid Chiu MRN: [id number] :1955 You are allergic to the following No active allergies Recent Documentation Weight BMI  
  
  
  
 85.4 kg 29.49 kg/m2 Emergency Contacts Name Discharge Info Relation Home Work Mobile KemalJacey DISCHARGE CAREGIVER [3] Child [2] [phone] KemalGirma DISCHARGE CAREGIVER [3] Spouse [3] [phone] About your hospitalization You were admitted on:  2017 You last received care in the:  [facility] You were discharged on:  2017 Unit phone number:  [phone] Why you were hospitalized Your primary diagnosis was:  Right Frontal Lobe Mass Your diagnoses also included:  Memory Loss, Leukocytosis, Nausea & Vomiting, Vasogenic Brain Edema (Hcc), Headache Providers Seen During Your Hospitalizations Provider Role Specialty Primary office phone Brady Blood MD Attending Provider [facility] [phone] Carolina George MD Attending Provider Internal Medicine [phone] Dylan Lr MD Attending Provider Internal Medicine [phone] Your Primary Care Physician (PCP) Primary Care Physician Office Phone Office Fax OTHER, PHYS ** None ** ** None ** Follow-up Information Follow up With Details Comments Contact Info Neurosurgery In 2 days Current Discharge Medication List  
  
START taking these medications Dose & Instructions Dispensing Information Comments Morning Noon Evening Bedtime  
 dexamethasone 4 mg tablet Commonly known as:  DECADRON Your last dose was: Your next dose is:    
   
   
 1 tab every 6 hours Quantity:  28 Tab Refills:  0  
     
   
   
   
  
 famotidine 20 mg tablet Commonly known as:  PEPCID Your last dose was: Your next dose is:    
   
   
 Dose:  20 mg Take 1 Tab by mouth two (2) times a day. Quantity:  28 Tab Refills:  0  
     
   
   
   
  
 levETIRAcetam 500 mg tablet Commonly known as:  KEPPRA Your last dose was: Your next dose is:    
   
   
 Dose:  500 mg Take 1 Tab by mouth two (2) times a day. Quantity:  28 Tab Refills:  0 Where to Get Your Medications Information on where to get these meds will be given to you by the nurse or doctor. ! Ask your nurse or doctor about these medications  
  dexamethasone 4 mg tablet  
 famotidine 20 mg tablet  
 levETIRAcetam 500 mg tablet Discharge Instructions Discharge Instructions PATIENT ID: Kyra Koenig MRN: [id number] YOB: 1955 DATE OF ADMISSION: 7/7/2017  1:24 AM   
DATE OF DISCHARGE: 7/8/2017 PRIMARY CARE PROVIDER: Tricia Gan MD  
 
ATTENDING PHYSICIAN: Jamal Clarke MD 
DISCHARGING PROVIDER: Jamal Clarke MD   
To contact this individual call [phone] and ask the  to page. If unavailable ask to be transferred the Adult Hospitalist Department. DISCHARGE DIAGNOSES Frontal mass CONSULTATIONS: IP CONSULT TO NEUROSURGERY 
 
PROCEDURES/SURGERIES: * No surgery found * PENDING TEST RESULTS:  
At the time of discharge the following test results are still pending: none FOLLOW UP APPOINTMENTS:  
Follow-up Information Follow up With Details Comments Contact Info Neurosurgery In 2 days ADDITIONAL CARE RECOMMENDATIONS:  
Follow up with Neurosurgery on Monday in 2 days DIET: Regular Diet ACTIVITY: Activity as tolerated DISCHARGE MEDICATIONS: 
 See Medication Reconciliation Form · It is important that you take the medication exactly as they are prescribed.   
· Keep your medication in the bottles provided by the pharmacist and keep a list of the medication names, dosages, and times to be taken in your wallet. · Do not take other medications without consulting your doctor. NOTIFY YOUR PHYSICIAN FOR ANY OF THE FOLLOWING:  
Fever over 101 degrees for 24 hours. Chest pain, shortness of breath, fever, chills, nausea, vomiting, diarrhea, change in mentation, falling, weakness, bleeding. Severe pain or pain not relieved by medications. Or, any other signs or symptoms that you may have questions about. DISPOSITION: 
x  Home With: 
 OT  PT  [facility]  RN  
  
 SNF/Inpatient Rehab/LTAC Independent/assisted living Hospice Other: CDMP Checked:  
Yes x PROBLEM LIST Updated: 
Yes x Signed:  
Roland Bose MD 
7/8/2017 11:39 AM 
 
Discharge Orders None [facility] Announcement We are excited to announce that we are making your provider's discharge notes available to you in [facility]. You will see these notes when they are completed and signed by the physician that discharged you from your recent hospital stay. If you have any questions or concerns about any information you see in [facility], please call the Health Information Department where you were seen or reach out to your Primary Care Provider for more information about your plan of care. Introducing [facility]! William Torre introduces [facility] patient portal. Now you can access parts of your medical record, email your doctor's office, and request medication refills online. 1. In your internet browser, go to [email or web address] 2. Click on the First Time User? Click Here link in the Sign In box. You will see the New Member Sign Up page. 3. Enter your [facility] Access Code exactly as it appears below. You will not need to use this code after youve completed the sign-up process. If you do not sign up before the expiration date, you must request a new code. · [facility] Access Code: [email or web address] Expires: 10/4/2017  9:15 PM 
 
4.  Enter the last four digits of your Social Security Number (xxxx) and Date of Birth (mm/dd/yyyy) as indicated and click Submit. You will be taken to the next sign-up page. 5. Create a [facility] ID. This will be your [facility] login ID and cannot be changed, so think of one that is secure and easy to remember. 6. Create a [facility] password. You can change your password at any time. 7. Enter your Password Reset Question and Answer. This can be used at a later time if you forget your password. 8. Enter your e-mail address. You will receive e-mail notification when new information is available in [address] 9. Click Sign Up. You can now view and download portions of your medical record. 10. Click the Download Summary menu link to download a portable copy of your medical information. If you have questions, please visit the Frequently Asked Questions section of the [facility] website. Remember, [facility] is NOT to be used for urgent needs. For medical emergencies, dial 911. Now available from your iPhone and Android! General Information Please provide this summary of care documentation to your next provider. Patient Signature:  ____________________________________________________________ Date:  ____________________________________________________________  
  
Neal Morgna Provider Signature:  ____________________________________________________________ Date:  ____________________________________________________________

## 2017-07-07 NOTE — ROUTINE PROCESS
0030 - Verbal shift change report given to Aakash Lu RN (oncoming nurse) by Toyin Murry [id number] [place] [facility] RN (offgoing nurse). Report included the following information SBAR, Kardex, ED Summary, Procedure Summary, Intake/Output, MAR, Recent Results and Cardiac Rhythm NSR. A&Ox4, follows commands, neuro intact, Q2 checks. Skin intact. NSR. RA, clear.       0130  -pt arrives via transport  0145 - pt up to under counter commode, stable with one standby assist.  0200 - pt settled, in bed, resting, family (daughter and  present), MRI screening and database completed  0245 - dysphagia screening completed and intact, pt eating a sandwich   0300 - pt resting  0630 - pt scheduled for MRI at 0700, pt okay to travel w/o nurse or monitor

## 2017-07-07 NOTE — PROGRESS NOTES
Care Management : attempted to see patient and she is having a CT scan done. Will revisit upon return to floor. Patient returned from [address]. Patient in bed eating lunch, alert and oriented with no complaints. PMH: memory loss, N/V, brain edema and headaches. Admitting diagnosis: right frontal lobe mass. Reviewed chart and went over demographics with patient: PCP: in [facility] / Dr. Lizy Roy. Obtains medications from local pharmacy in [place] by home.  is support and no DME equipment in use. Care Management Interventions  PCP Verified by CM: Yes (Patient traveling from [place] ( resides there) )  Palliative Care Consult (Criteria: CHF and RRAT>21): No  Reason for No Palliative Care Consult:  (at his point no need/ pending transiiton of care and possible OR)  Mode of Transport at Discharge: Other (see comment) (pending disposition / home with )  Transition of Care Consult (CM Consult):  Other (from [place] traveling to [facility]. pending disposition wants to go home.)  MyChart Signup: No  Discharge Durable Medical Equipment: No  Physical Therapy Consult: No  Occupational Therapy Consult: No  Speech Therapy Consult: No  Current Support Network: Lives with Spouse, Own Home  Confirm Follow Up Transport: Family  Plan discussed with Pt/Family/Caregiver: Yes (patient no family members present.)  Discharge Location  Discharge Placement:  (pending transition of care if surgery needed / home with  to [place])    Jaime Dale RN BSN

## 2017-07-08 VITALS
OXYGEN SATURATION: 90 % | BODY MASS INDEX: 29.49 KG/M2 | DIASTOLIC BLOOD PRESSURE: 67 MMHG | TEMPERATURE: 98.6 F | HEART RATE: 87 BPM | RESPIRATION RATE: 18 BRPM | SYSTOLIC BLOOD PRESSURE: 106 MMHG | WEIGHT: 188.27 LBS

## 2017-07-08 LAB
ANION GAP BLD CALC-SCNC: 10 MMOL/L (ref 5–15)
BASOPHILS # BLD AUTO: 0 K/UL (ref 0–0.1)
BASOPHILS # BLD: 0 % (ref 0–1)
BUN SERPL-MCNC: 15 MG/DL (ref 6–20)
BUN/CREAT SERPL: 20 (ref 12–20)
CALCIUM SERPL-MCNC: 9 MG/DL (ref 8.5–10.1)
CHLORIDE SERPL-SCNC: 108 MMOL/L (ref 97–108)
CO2 SERPL-SCNC: 24 MMOL/L (ref 21–32)
CREAT SERPL-MCNC: 0.74 MG/DL (ref 0.55–1.02)
EOSINOPHIL # BLD: 0 K/UL (ref 0–0.4)
EOSINOPHIL NFR BLD: 0 % (ref 0–7)
ERYTHROCYTE [DISTWIDTH] IN BLOOD BY AUTOMATED COUNT: 14 % (ref 11.5–14.5)
GLUCOSE SERPL-MCNC: 150 MG/DL (ref 65–100)
HCT VFR BLD AUTO: 41.1 % (ref 35–47)
HGB BLD-MCNC: 13.6 G/DL (ref 11.5–16)
LYMPHOCYTES # BLD AUTO: 7 % (ref 12–49)
LYMPHOCYTES # BLD: 1.1 K/UL (ref 0.8–3.5)
MAGNESIUM SERPL-MCNC: 2.3 MG/DL (ref 1.6–2.4)
MCH RBC QN AUTO: 30.2 PG (ref 26–34)
MCHC RBC AUTO-ENTMCNC: 33.1 G/DL (ref 30–36.5)
MCV RBC AUTO: 91.3 FL (ref 80–99)
MONOCYTES # BLD: 0.9 K/UL (ref 0–1)
MONOCYTES NFR BLD AUTO: 6 % (ref 5–13)
NEUTS SEG # BLD: 13 K/UL (ref 1.8–8)
NEUTS SEG NFR BLD AUTO: 87 % (ref 32–75)
PHOSPHATE SERPL-MCNC: 3.3 MG/DL (ref 2.6–4.7)
PLATELET # BLD AUTO: 235 K/UL (ref 150–400)
POTASSIUM SERPL-SCNC: 4.3 MMOL/L (ref 3.5–5.1)
RBC # BLD AUTO: 4.5 M/UL (ref 3.8–5.2)
SODIUM SERPL-SCNC: 142 MMOL/L (ref 136–145)
WBC # BLD AUTO: 15 K/UL (ref 3.6–11)

## 2017-07-08 PROCEDURE — 80048 BASIC METABOLIC PNL TOTAL CA: CPT | Performed by: NEUROLOGICAL SURGERY

## 2017-07-08 PROCEDURE — 74011250637 HC RX REV CODE- 250/637: Performed by: NEUROLOGICAL SURGERY

## 2017-07-08 PROCEDURE — 85025 COMPLETE CBC W/AUTO DIFF WBC: CPT | Performed by: NEUROLOGICAL SURGERY

## 2017-07-08 PROCEDURE — 36415 COLL VENOUS BLD VENIPUNCTURE: CPT | Performed by: NEUROLOGICAL SURGERY

## 2017-07-08 PROCEDURE — 74011250636 HC RX REV CODE- 250/636: Performed by: NEUROLOGICAL SURGERY

## 2017-07-08 PROCEDURE — 83735 ASSAY OF MAGNESIUM: CPT | Performed by: NEUROLOGICAL SURGERY

## 2017-07-08 PROCEDURE — 84100 ASSAY OF PHOSPHORUS: CPT | Performed by: NEUROLOGICAL SURGERY

## 2017-07-08 RX ORDER — DEXAMETHASONE 4 MG/1
TABLET ORAL
Qty: 28 TAB | Refills: 0 | Status: SHIPPED | OUTPATIENT
Start: 2017-07-08

## 2017-07-08 RX ORDER — LEVETIRACETAM 500 MG/1
500 TABLET ORAL 2 TIMES DAILY
Qty: 28 TAB | Refills: 0 | Status: SHIPPED | OUTPATIENT
Start: 2017-07-08

## 2017-07-08 RX ORDER — FAMOTIDINE 20 MG/1
20 TABLET, FILM COATED ORAL 2 TIMES DAILY
Qty: 28 TAB | Refills: 0 | Status: SHIPPED | OUTPATIENT
Start: 2017-07-08

## 2017-07-08 RX ADMIN — LEVETIRACETAM 500 MG: 500 TABLET ORAL at 08:57

## 2017-07-08 RX ADMIN — DEXAMETHASONE 4 MG: 4 TABLET ORAL at 06:56

## 2017-07-08 RX ADMIN — Medication 10 ML: at 06:57

## 2017-07-08 RX ADMIN — DEXAMETHASONE 4 MG: 4 TABLET ORAL at 12:55

## 2017-07-08 RX ADMIN — DOCUSATE SODIUM 100 MG: 100 CAPSULE, LIQUID FILLED ORAL at 08:57

## 2017-07-08 RX ADMIN — FAMOTIDINE 20 MG: 20 TABLET ORAL at 08:57

## 2017-07-08 NOTE — ROUTINE PROCESS
1930 - Bedside and Verbal shift change report given to [address] [place] [address] (oncoming nurse) by Nelida Gardner RN (offgoing nurse). Report included the following information SBAR, Kardex, ED Summary, Procedure Summary, Intake/Output, MAR, Recent Results and Cardiac Rhythm NSR.

## 2017-07-08 NOTE — PROGRESS NOTES
Pt stable. H/a and nausea gone on steroids. Family wish to return to [facility] where they live for surgery. I think its safe and reasonable for her to be discharged on decadron and keppra. Will try to communicate with neurosurgeon before d/c.

## 2017-07-08 NOTE — DISCHARGE SUMMARY
Discharge Summary       PATIENT ID: Yasmeen Kothari  MRN: [id number]   YOB: 1955    DATE OF ADMISSION: 7/7/2017  1:24 AM    DATE OF DISCHARGE: 7/8/2017   PRIMARY CARE PROVIDER: Nicholas Randolph MD     ATTENDING PHYSICIAN: Dr Saul Us  DISCHARGING PROVIDER: Saul Us MD    To contact this individual call [phone] and ask the  to page. If unavailable ask to be transferred the Adult Hospitalist Department. 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 Marlys Leon;   - IV Decadron 10 mg IV q.6 hours, neurovascular checks, fall precautions, seizure precautions. - pain management for headaches;   - related memory loss. -CT head 7/6 Findings suspicious for a right frontal lobe mass with surrounding vasogenic  edema resulting in leftward midline shift of 8 mm. There is no acute intracranial hemorrhage  - MRI with and without contrast 7/07: Large right frontal lobe intra-axial mass with marked surrounding edema and mass effect. Imaging features favor a high-grade glioma. -CT chest/abd/pelvis 7/7 unremarkable  -The patient wants to return to [place], [place] and follow up with physicians over there. Neurosurgery has cleared the patient regarding that to be discharged on Decadron/keppra    Leukocytosis  -likely sec to steroids    Regular diet    Code status: full  DVT prophylaxis: SCD's  PTA: home    Plan: Discharge to home. She will be going back to [place], [place] tomorrow. DISCHARGE DIAGNOSES / PLAN:      1.   Frontal mass with vasogenic edema       PENDING TEST RESULTS:   At the time of discharge the following test results are still pending: none    FOLLOW UP APPOINTMENTS:    Follow-up Information     Follow up With Details Comments Contact Info    Neurosurgery In 2 days             ADDITIONAL CARE RECOMMENDATIONS: Follow up with Neurosurgery in 2 days    DIET: Regular Diet    ACTIVITY: Activity as tolerated      DISCHARGE MEDICATIONS:  Current Discharge Medication List      START taking these medications    Details   dexamethasone (DECADRON) 4 mg tablet 1 tab every 6 hours  Qty: 28 Tab, Refills: 0      famotidine (PEPCID) 20 mg tablet Take 1 Tab by mouth two (2) times a day. Qty: 28 Tab, Refills: 0      levETIRAcetam (KEPPRA) 500 mg tablet Take 1 Tab by mouth two (2) times a day. Qty: 28 Tab, Refills: 0               NOTIFY YOUR PHYSICIAN FOR ANY OF THE FOLLOWING:   Fever over 101 degrees for 24 hours. Chest pain, shortness of breath, fever, chills, nausea, vomiting, diarrhea, change in mentation, falling, weakness, bleeding. Severe pain or pain not relieved by medications. Or, any other signs or symptoms that you may have questions about.     DISPOSITION:   x Home With:   OT  PT  HH  RN       Long term SNF/Inpatient Rehab    Independent/assisted living    Hospice    Other:       PATIENT CONDITION AT DISCHARGE:     Functional status    Poor     Deconditioned    x Independent      Cognition    x Lucid     Forgetful     Dementia      Catheters/lines (plus indication)    Patel     PICC     PEG    x None      Code status    x Full code     DNR      PHYSICAL EXAMINATION AT DISCHARGE:  Please see progress note      CHRONIC MEDICAL DIAGNOSES:  Problem List as of 7/8/2017  Date Reviewed: 7/7/2017          Codes Class Noted - Resolved    Memory loss ICD-10-CM: R41.3  ICD-9-CM: 780.93  7/7/2017 - Present        Leukocytosis ICD-10-CM: D72.829  ICD-9-CM: 288.60  7/7/2017 - Present        Nausea & vomiting ICD-10-CM: R11.2  ICD-9-CM: 787.01  7/7/2017 - Present        Vasogenic brain edema (HCC) ICD-10-CM: G93.6  ICD-9-CM: 348.5  7/7/2017 - Present        * (Principal)Right frontal lobe mass ICD-10-CM: G93.9  ICD-9-CM: 348.9  7/7/2017 - Present        Headache ICD-10-CM: R51  ICD-9-CM: 784.0  7/7/2017 - Present              Greater than 37 minutes were spent with the patient on counseling and coordination of care    Signed:   Brandi Miller MD  7/8/2017  11:40 AM

## 2020-10-14 NOTE — CONSULTS
[address] [facility] [place] [address]   [address] [facility] [address]       Name:  Indio Robles   MR#:  [id number]   :  1955   Account #:  [de-identified]    Date of Consultation:     Date of Adm:  2017       CHIEF COMPLAINT: Right frontal brain mass with headache, nausea   and vomiting. Ms. 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 She is traveling to [place] in   an [address] with her family, passing through [place]. She has got three   weeks of progressive headaches causing nausea and vomiting more   recently. She has no known history of cancer, she has no history of   recent illnesses or infections. No recent major dental work or problems   or sinusitis. She presented to [facility] ER, noncontrast head   CT was performed which shows significant edema in the right frontal   lobe with mass effect, likely a mass in the medial right frontal lobe. She   was transferred to [facility] ICU for further workup and   evaluation. She has been given intravenous steroids. She has not been   having any seizures. She is left-handed. She denies any significant   speech problems. She denies focal motor or sensory changes. She has   been generally lethargic. PAST MEDICAL HISTORY: She reports no major medical problems. PAST SURGERIES: Not known. ALLERGIES: NO KNOWN DRUG ALLERGIES. MEDICATIONS: No major medications. She has had a recent negative mammogram.    SOCIAL HISTORY: She is  and has children and lives in   [place]. She does not drink or smoke. REVIEW OF SYSTEMS: As above. NEUROLOGIC: She is a healthy appearing female in no apparent   distress. She is awake, alert and oriented x3. GCS of 15. She has a   generally flat affect. She appears to have good bilateral motor and   sensory exam, no focal weakness or numbness. ABDOMEN: Soft. CHEST: Clear.    HEENT: Pupils are equal, round and reactive to light. IMAGING STUDIES: CT as above. Portable chest x-ray was negative. PERTINENT FINDINGS: White count on arrival was 14,000. Urinalysis   is negative. IMPRESSION: Ms. Clifton Gonzales has significant right frontal edema with likely   underlying mass, this is concerning for a right frontal tumor. Abscess is   possible. She has no history of recent infections or dental work. No   known history of immunocompromization. She is scheduled for an MRI   this morning. She will continue on Dexamethasone. We will make   further recommendations pending MRI as to her plan of treatment and   disposition. MD DAO Zamora / Toni Dickinson   D:  07/07/2017   06:58   T:  07/07/2017   10:59   Job #:  [id number] The patient has been re-examined and I agree with the above assessment or I updated with my findings.

## 2024-10-01 NOTE — IP AVS SNAPSHOT
Current Discharge Medication List  
  
START taking these medications Dose & Instructions Dispensing Information Comments Morning Noon Evening Bedtime  
 dexamethasone 4 mg tablet Commonly known as:  DECADRON Your last dose was: Your next dose is:    
   
   
 1 tab every 6 hours Quantity:  28 Tab Refills:  0  
     
   
   
   
  
 famotidine 20 mg tablet Commonly known as:  PEPCID Your last dose was: Your next dose is:    
   
   
 Dose:  20 mg Take 1 Tab by mouth two (2) times a day. Quantity:  28 Tab Refills:  0  
     
   
   
   
  
 levETIRAcetam 500 mg tablet Commonly known as:  KEPPRA Your last dose was: Your next dose is:    
   
   
 Dose:  500 mg Take 1 Tab by mouth two (2) times a day. Quantity:  28 Tab Refills:  0 Where to Get Your Medications Information on where to get these meds will be given to you by the nurse or doctor. ! Ask your nurse or doctor about these medications  
  dexamethasone 4 mg tablet  
 famotidine 20 mg tablet  
 levETIRAcetam 500 mg tablet HTN. Gastritis. CAD. CP.